# Patient Record
Sex: MALE | Race: BLACK OR AFRICAN AMERICAN | NOT HISPANIC OR LATINO | URBAN - METROPOLITAN AREA
[De-identification: names, ages, dates, MRNs, and addresses within clinical notes are randomized per-mention and may not be internally consistent; named-entity substitution may affect disease eponyms.]

---

## 2019-03-14 PROBLEM — Z00.00 ENCOUNTER FOR PREVENTIVE HEALTH EXAMINATION: Status: ACTIVE | Noted: 2019-03-14

## 2019-04-18 ENCOUNTER — EMERGENCY (EMERGENCY)
Facility: HOSPITAL | Age: 59
LOS: 1 days | Discharge: HOME | End: 2019-04-18
Admitting: EMERGENCY MEDICINE
Payer: COMMERCIAL

## 2019-04-18 VITALS
RESPIRATION RATE: 16 BRPM | DIASTOLIC BLOOD PRESSURE: 82 MMHG | SYSTOLIC BLOOD PRESSURE: 144 MMHG | OXYGEN SATURATION: 100 % | TEMPERATURE: 98 F | HEART RATE: 66 BPM

## 2019-04-18 PROCEDURE — 99053 MED SERV 10PM-8AM 24 HR FAC: CPT

## 2019-04-18 PROCEDURE — 73110 X-RAY EXAM OF WRIST: CPT | Mod: 26,LT

## 2019-04-18 PROCEDURE — 99283 EMERGENCY DEPT VISIT LOW MDM: CPT | Mod: 25

## 2019-04-18 NOTE — ED PROVIDER NOTE - PHYSICAL EXAMINATION
Vital signs reviewed.   CONSTITUTIONAL: Well-appearing; well-nourished; in no apparent distress. Non-toxic appearing.   HEAD: Normocephalic, atraumatic.  EYES: PERRL, EOM intact, conjunctiva and sclera WNL.  ENT: normal nose; no rhinorrhea;   NECK: no midline tenderness noted.   CARD: Normal S1, S2; no murmurs, rubs, or gallops noted.  RESP: Normal chest excursion with respiration; breath sounds clear and equal bilaterally; no wheezes, rhonchi, or rales.  ABD/GI: soft, non-distended; non-tender;   EXT/MS: moves all extremities; distal pulses are normal, no pedal edema. No midline tenderness noted. +TTP noted to Lt wrist ulnar aspect on volar side. +discomfort with extension of wrist. 5/5 strength UE/LE b/l. Pt ambulatory in ED. No crepitus or obvious deformities. No swelling, redness noted.  No other bony tenderness.   SKIN: Normal for age and race; warm; dry; good turgor; no apparent lesions or exudate noted. No signs of infection noted.   NEURO: Awake, alert, oriented x 3, no gross deficits, no motor or sensory deficit noted.  PSYCH: Normal mood; appropriate affect.

## 2019-04-18 NOTE — ED PROVIDER NOTE - NSFOLLOWUPINSTRUCTIONS_ED_ALL_ED_FT
Patient advised to follow up with PRIMARY CARE DOCTOR 1-2 DAYS AND ORTHOPEDIST WITHIN WEEK and told to return to the emergency department immediately for any new or concerning symptoms OR ANY OTHER COMPLAINTS. Patient agrees with plan.    Recommend motrin or tylenol as needed for pain  Rest, ice, elevate, avoid heavy lifting, or strenuous activity
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
## 2019-04-18 NOTE — ED PROVIDER NOTE - PROGRESS NOTE DETAILS
Results of imaging negative. ACE wrap applied. Pt given Orthopedic follow up and advised to f./u in office. Recommend RICE, avoid heavy lifting or strenuous activity. Take motrin or tylenol as needed for pain. Pt understood and agreed with plan. All question and concerns addressed. Strict return instructions given. [place]: Pt was not seen or evaluated by me. Signed as per hospital policy.

## 2019-04-18 NOTE — ED PROVIDER NOTE - CARE PLAN
Principal Discharge DX:	Left wrist pain  Assessment and plan of treatment:	Patient advised to follow up with PRIMARY CARE DOCTOR 1-2 DAYS AND ORTHOPEDIST WITHIN WEEK and told to return to the emergency department immediately for any new or concerning symptoms OR ANY OTHER COMPLAINTS. Patient agrees with plan.    Recommend motrin or tylenol as needed for pain  Rest, ice, elevate, avoid heavy lifting, or strenuous activity
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
## 2023-01-29 ENCOUNTER — EMERGENCY (EMERGENCY)
Facility: HOSPITAL | Age: 63
LOS: 1 days | Discharge: HOME | End: 2023-01-29
Attending: STUDENT IN AN ORGANIZED HEALTH CARE EDUCATION/TRAINING PROGRAM | Admitting: STUDENT IN AN ORGANIZED HEALTH CARE EDUCATION/TRAINING PROGRAM
Payer: COMMERCIAL

## 2023-01-29 VITALS
OXYGEN SATURATION: 97 % | RESPIRATION RATE: 18 BRPM | HEART RATE: 87 BPM | SYSTOLIC BLOOD PRESSURE: 148 MMHG | DIASTOLIC BLOOD PRESSURE: 70 MMHG | TEMPERATURE: 98 F

## 2023-01-29 VITALS
TEMPERATURE: 98 F | DIASTOLIC BLOOD PRESSURE: 84 MMHG | HEART RATE: 77 BPM | SYSTOLIC BLOOD PRESSURE: 143 MMHG | RESPIRATION RATE: 18 BRPM | OXYGEN SATURATION: 98 %

## 2023-01-29 PROBLEM — Z78.9 OTHER SPECIFIED HEALTH STATUS: Chronic | Status: ACTIVE | Noted: 2019-04-19

## 2023-01-29 PROCEDURE — 99284 EMERGENCY DEPT VISIT MOD MDM: CPT

## 2023-01-29 RX ORDER — FAMOTIDINE 10 MG/ML
20 INJECTION INTRAVENOUS DAILY
Refills: 0 | Status: DISCONTINUED | OUTPATIENT
Start: 2023-01-29 | End: 2023-02-01

## 2023-01-29 RX ORDER — DEXAMETHASONE 0.5 MG/5ML
10 ELIXIR ORAL ONCE
Refills: 0 | Status: COMPLETED | OUTPATIENT
Start: 2023-01-29 | End: 2023-01-29

## 2023-01-29 RX ORDER — DIPHENHYDRAMINE HCL 50 MG
50 CAPSULE ORAL ONCE
Refills: 0 | Status: COMPLETED | OUTPATIENT
Start: 2023-01-29 | End: 2023-01-29

## 2023-01-29 RX ADMIN — FAMOTIDINE 20 MILLIGRAM(S): 10 INJECTION INTRAVENOUS at 07:57

## 2023-01-29 RX ADMIN — Medication 50 MILLIGRAM(S): at 07:57

## 2023-01-29 RX ADMIN — Medication 10 MILLIGRAM(S): at 07:58

## 2023-01-29 NOTE — ED PROVIDER NOTE - PATIENT PORTAL LINK FT
You can access the FollowMyHealth Patient Portal offered by [facility] by registering at the following website: [email or web address]. By joining [employer]’s FollowMyHealth portal, you will also be able to view your health information using other applications (apps) compatible with our system.

## 2023-01-29 NOTE — ED ADULT TRIAGE NOTE - CHIEF COMPLAINT QUOTE
Pt states woke up this morning with swollen lips. Denies SOB or itching of throat, no drooling noted and able to speak in complete sentences. Denies hx of allergies. Pt on lisinopril

## 2023-01-29 NOTE — ED ADULT NURSE NOTE - CHIEF COMPLAINT QUOTE
Pt states woke up this morning with swollen lips. Denies SOB or itching of throat, no drooling noted and able to speak in complete sentences. Denies hx of allergies. Pt on lisinopril (2) assistive person

## 2023-01-29 NOTE — ED PROVIDER NOTE - ATTENDING CONTRIBUTION TO CARE
I have personally performed a face to face medical and diagnostic evaluation of the patient. I have discussed with and reviewed the Resident's note and agree with the History, ROS, Physical Exam and MDM unless otherwise indicated. A brief summary of my personal evaluation and impression can be found below.    Carlitos WESTON: 62-year-old male with a history of hypertension, diabetes, hyperlipidemia presents with a chief complaint of concern for upper lip swelling that started yesterday.  Patient reports has been taking lisinopril for a long time no recent changes has been Taking his medications as indicated, noticed upper lip swelling starting yesterday, no throat discomfort no changes in voice no drooling no abdominal pain no nausea no vomiting no wheezing no shortness of breath, possible prior history of asthma, can move everything and feel everything.  No chest pain.  Thought his upper lip swelling got worse this morning prompting ED visit.  Patient reports he took Benadryl yesterday.  No rashes.    All other ROS negative, except as above and as per HPI and ROS section.    VITALS: Initial triage and subsequent vitals have been reviewed by me.  GEN APPEARANCE: WDWN, alert, non-toxic, NAD  HEAD: Atraumatic.  EYES: PERRLa, EOMI, vision grossly intact.   MOUTH: Upper lip swelling MMM, no tonsillar erythema, swelling or exudates, protecting airway, handling secretions.  NECK: Supple  CV: RRR, S1S2, no c/r/m/g. Cap refill <2 seconds. No bruits.   LUNGS: CTAB. No abnormal breath sounds.  ABDOMEN: Soft, NTND. No guarding or rebound.   MSK/EXT: No spinal or extremity point tenderness. No CVA ttp. Pelvis stable. No peripheral edema.  NEURO: Alert, follows commands. Weight bearing normal. Speech normal. Sensation and motor normal x4 extremities.   SKIN: Warm, dry and intact. No rash.  PSYCH: Appropriate    Plan/MDM: Carlitos WESTON: 62-year-old male with a history of hypertension, diabetes, hyperlipidemia presents with a chief complaint of concern for upper lip swelling that started yesterday.  Patient reports has been taking lisinopril for a long time no recent changes has been Taking his medications as indicated, noticed upper lip swelling starting yesterday, no throat discomfort no changes in voice no drooling no abdominal pain no nausea no vomiting no wheezing no shortness of breath, possible prior history of asthma, can move everything and feel everything.  No chest pain. Exam vital signs stable nontoxic well-appearing with physical exam as above.  DDx concern for likely angioedema possibly bradykinin induced secondary to him being on an ACE inhibitor, hours patient well-appearing low concern for acute airway compromise, will give Benadryl steroids Pepcid, monitor airway and observe at at least 4 hours, reassess, if clinically improved will consider discharge with strict return precautions and close PMD follow-up, however if patient getting worse will consider advanced work-up possibly observation in CDU.

## 2023-01-29 NOTE — ED PROVIDER NOTE - NSFOLLOWUPINSTRUCTIONS_ED_ALL_ED_FT
Angioedema      Angioedema is the sudden swelling of tissue in the body. Angioedema can affect any part of the body, including the legs, hands, genitals, face, mouth, lips, and internal organs, like your intestines.    Depending on the cause, angioedema may happen just once. However, some people may have repeated bouts of angioedema during their lives.    Symptoms may be mild and may occur along with other allergic symptoms such as itchy, red, swollen areas of skin (hives). Severe angioedema can be life-threatening if it affects the air passages and blocks breathing.      What are the causes?    This condition may be caused by:  •Foods, such as milk, eggs, shellfish, wheat, or nuts.      •Certain medicines, such as ACE inhibitors, birth control pills, dyes used in X-rays, or NSAIDs, such as ibuprofen.      Hereditary angioedema (HAE) is genetic. Episodes can be triggered by:  •Illness, infection, or emotional or physical stress.      •Changes in hormone levels.       •Exercise.      •Minor surgical or dental procedures.      In some cases, the cause of this condition is not known.      What increases the risk?    You are more likely to develop HAE if you have family members with this condition.       What are the signs or symptoms?  A hand with angioedema compared to a normal hand.    Symptoms of this condition depend on where the swelling happens.     Symptoms of this condition include:  •Swollen skin.      •Hives.      •Pain, pressure, or tenderness in the affected area.      •Swollen eyelids, face, lips, or tongue.      •Trouble drinking, swallowing, or closing the mouth completely.      •Hoarseness or sore throat.      •Wheezing or trouble breathing.      If your internal organs are affected, symptoms may also include:  •Nausea.      •Pain in the abdomen.      •Vomiting or diarrhea.      •Trouble swallowing.      •Trouble passing urine.        How is this diagnosed?    This condition may be diagnosed based on:  •An exam of the affected area.      •Your medical history.      •Whether anyone in your family has had this condition before.      •A review of any medicines you have been taking.    •Tests, including:  •Allergy skin tests to see if the condition was caused by an allergic reaction.      •Blood tests to see if the condition was caused by certain inherited or genetic diseases.          How is this treated?    Treatment for this condition depends on the cause and severity of your symptoms. It may involve any of the following:  •Avoiding triggers, if they are known. Triggers may include foods or environmental allergens.      •Stopping medicines permanently if they cause the condition. These include ACE inhibitors.    •Taking medicines to treat symptoms or prevent future episodes. These may include:  •Antihistamines.      •Epinephrine injections.      •Steroids.      •Blood products to treat specific types of non-allergic angioedema.        •Breathing tubes or ventilators in severe cases in which breathing is affected.      Severe cases of angioedema are treated at the hospital. Mild to moderate angioedema usually gets better in 24–48 hours.      Follow these instructions at home:  Medical alert bracelet.   •Take over-the-counter and prescription medicines only as told by your health care provider.      •If you were given medicines for emergency allergy treatment, always carry them with you. This includes epinephrine injector kits.      •Wear a medical bracelet as told by your health care provider.      •If something triggers your condition, avoid the trigger. Triggers can be foods, environmental allergens, stress, or exercise.      •Avoid all medicines that caused your angioedema. This is for your entire life.      •If your condition is inherited and you are thinking about having children, talk to your health care provider. It is important to discuss the risks of passing on the condition to your children.        Where to find more information    •[employer]hma [employer]: www.aaaai.org        Contact a health care provider if:    •You continue to have repeated episodes of angioedema.      •Episodes of angioedema start to happen more often than they used to, even after you take steps to prevent them.      •You have episodes of angioedema that are more severe than they have been before, even after you take steps to prevent them.      •You are thinking about having children.        Get help right away if:    •You have severe swelling of your mouth, tongue, or lips.      •Your swelling gets worse.      •You have trouble breathing, swallowing, or talking.      •You have chest pain, dizziness or light-headedness, or you pass out.      These symptoms may represent a serious problem that is an emergency. Do not wait to see if the symptoms will go away. Get medical help right away. Call your local emergency services (911 in the [place] Do not drive yourself to the hospital.       Summary    •Angioedema is the sudden swelling of tissues.      •It is important to be aware of all triggers or causes for your angioedema and to avoid them.      •Treatment for this condition depends on the cause and severity of your symptoms.      •Severe angioedema can be life-threatening if it blocks the air passages.      This information is not intended to replace advice given to you by your health care provider. Make sure you discuss any questions you have with your health care provider.

## 2023-01-29 NOTE — ED ADULT NURSE NOTE - OBJECTIVE STATEMENT
61 y/o male with hx DM, HTN on lisinopril presents to the ED for angioedema x yesterday. Pt has been on lisinopril for a while. Denies taking any new meds or eating any new food. Upper lip noted to be swollen; pt reports it has gotten increasingly swollen. Pt took Benadryl yesterday evening to no avail. Pt denies throat itching, tongue swelling, change in voice or SOB.

## 2023-01-29 NOTE — ED PROVIDER NOTE - PROGRESS NOTE DETAILS
LA Acosta (PGY-3) - pt w/o worsening symptoms, breathing unlabored, orophaynx still clear and patent. Will dc. Pt understands return precautions. Wife verbalizes same understanding. Additionally, he will see his PCP tomorrow.

## 2023-01-29 NOTE — ED PROVIDER NOTE - CLINICAL SUMMARY MEDICAL DECISION MAKING FREE TEXT BOX
62-year-old male history of hypertension, diabetes, hyperlipidemia here for angioedema.  Patient yesterday took his lisinopril and around 3 PM started noticing swelling of his upper lip which has worsened into today.  At no point during this course has he had any trouble breathing, difficulty speaking, difficulty eating.  He otherwise does not have any rashes or abdominal pain.  He has no personal history of atopic disease.  Yesterday he took a Benadryl for this but otherwise no other meds.  He denies other new exposures including foods or drinks or medications.  No recent changes to his lisinopril dose.  No fevers or recent illnesses.    On examination patient is well-appearing, in no acute distress, speaking in full sentences in his usual voice (per his wife).  He has edema of his upper lip but no facial edema.  Posterior oropharynx without any erythema and is widely patent.  He has a midline trachea.  Otherwise no edema of his neck or submandibular space.  No signs of copious secretions.  Respiratory exam with clear lungs and no stridor.  No abdominal tenderness.    Patient's presentation is suggestive of angioedema possibly secondary to ACE inhibitor.  Given his lack of new exposures it is unlikely that this represents an allergic reaction.  Presentation is inconsistent with anaphylaxis.  He does not have any signs of impending airway compromise at this time.  Given such we will monitor here in the ER for progression of disease.  In the meantime we will give Decadron and antihistamines.  Closely monitor.

## 2023-01-31 NOTE — ED POST DISCHARGE NOTE - ADDITIONAL DOCUMENTATION
pt is feeling better- saw his PMD yesterday, his medications were changed, and he appreciated the phone call